# Patient Record
Sex: MALE | Race: ASIAN | NOT HISPANIC OR LATINO | URBAN - METROPOLITAN AREA
[De-identification: names, ages, dates, MRNs, and addresses within clinical notes are randomized per-mention and may not be internally consistent; named-entity substitution may affect disease eponyms.]

---

## 2024-02-02 ENCOUNTER — EMERGENCY (EMERGENCY)
Facility: HOSPITAL | Age: 39
LOS: 1 days | Discharge: HOME | End: 2024-02-02
Attending: STUDENT IN AN ORGANIZED HEALTH CARE EDUCATION/TRAINING PROGRAM | Admitting: STUDENT IN AN ORGANIZED HEALTH CARE EDUCATION/TRAINING PROGRAM
Payer: COMMERCIAL

## 2024-02-02 VITALS
OXYGEN SATURATION: 98 % | WEIGHT: 139.99 LBS | SYSTOLIC BLOOD PRESSURE: 112 MMHG | HEIGHT: 67 IN | HEART RATE: 100 BPM | TEMPERATURE: 98 F | RESPIRATION RATE: 22 BRPM | DIASTOLIC BLOOD PRESSURE: 71 MMHG

## 2024-02-02 PROCEDURE — 99284 EMERGENCY DEPT VISIT MOD MDM: CPT | Mod: 57

## 2024-02-02 PROCEDURE — 99053 MED SERV 10PM-8AM 24 HR FAC: CPT

## 2024-02-02 PROCEDURE — 27808 TREATMENT OF ANKLE FRACTURE: CPT | Mod: 54,RT

## 2024-02-02 NOTE — ED ADULT NURSE NOTE - NSFALLUNIVINTERV_ED_ALL_ED
Bed/Stretcher in lowest position, wheels locked, appropriate side rails in place/Call bell, personal items and telephone in reach/Instruct patient to call for assistance before getting out of bed/chair/stretcher/Non-slip footwear applied when patient is off stretcher/[place] to call system/Physically safe environment - no spills, clutter or unnecessary equipment/Purposeful proactive rounding/Room/bathroom lighting operational, light cord in reach

## 2024-02-02 NOTE — ED ADULT NURSE NOTE - SUICIDE SCREENING DEPRESSION
Negative Medical Necessity Clause: This procedure was medically necessary because the lesion that was treated was:

## 2024-02-03 VITALS
TEMPERATURE: 98 F | RESPIRATION RATE: 18 BRPM | DIASTOLIC BLOOD PRESSURE: 77 MMHG | SYSTOLIC BLOOD PRESSURE: 122 MMHG | OXYGEN SATURATION: 98 % | HEART RATE: 88 BPM

## 2024-02-03 PROCEDURE — 73620 X-RAY EXAM OF FOOT: CPT

## 2024-02-03 PROCEDURE — 99284 EMERGENCY DEPT VISIT MOD MDM: CPT

## 2024-02-03 PROCEDURE — 73610 X-RAY EXAM OF ANKLE: CPT

## 2024-02-03 PROCEDURE — 73620 X-RAY EXAM OF FOOT: CPT | Mod: 26,RT

## 2024-02-03 PROCEDURE — 29515 APPLICATION SHORT LEG SPLINT: CPT | Mod: RT

## 2024-02-03 PROCEDURE — 73610 X-RAY EXAM OF ANKLE: CPT | Mod: 26,RT

## 2024-02-03 NOTE — ED PROVIDER NOTE - MUSCULOSKELETAL, MLM
Spine appears normal, range of motion is not limited.  Right ankle with significant swelling and tenderness at lateral malleolus.  Mild ecchymosis.  No bone protrusion.  Decreased ROM.  +Right DP pulse, cap refill < 2 seconds

## 2024-02-03 NOTE — ED PROVIDER NOTE - NSFOLLOWUPINSTRUCTIONS_ED_ALL_ED_FT
Please follow up with orthopedic surgery.  Take Motrin or Tylenol for pain.  Do not bear weight on your foot, use crutches as instructed.  You will likely need surgery on your right ankle.  Return to the ER for persistent pain, swelling, numbness, in ability to walk, tingling, or any other concerns.     Nondisplaced Fibular Ankle Fracture Treated With Immobilization       A nondisplaced fibular ankle fracture is a simple break of the bottom of the fibula. The fibula is a bone in the lower leg, between the knee and the foot. In a nondisplaced fracture, the pieces of the broken bone line up with each other and are not out of place. This condition usually does not need surgery and can be treated with a splint or cast.    What are the causes?  This condition may be caused by:    A hard, direct hit or injury to the side of the leg.  A powerful twisting or rotating movement.  Rolling the ankle.  Falling or tripping.    What increases the risk?  You are more likely to develop this condition if:    You play sports that involve a lot of running and pivoting, such as basketball.  You play impact sports, such as football or soccer.  You smoke.  You have diabetes.  You have a history of ankle fractures.  You are obese.    What are the signs or symptoms?  Symptoms of this condition include:    Severe pain that begins immediately after the injury.  Bruising.  Swelling.  Inability to put weight on the injured ankle.  An ankle that is tender to the touch.    How is this diagnosed?  This condition is diagnosed based on:    Your medical history.  A physical exam.  Imaging tests to confirm the fracture and to check the extent of the injury. These tests may include:    X-rays.  Stress X-ray. During this test, your health care provider will put pressure on your ankle while taking an X-ray. This will help to determine whether your ankle is stable.  CT scan.  MRI.    How is this treated?  This condition may be treated with:    A splint.  Icing and raising (elevating) the ankle.  A cast.  A removable cast or walking boot.  Crutches. These may be needed to help you walk.    Follow these instructions at home:      If you have a splint, removable cast, or boot:    Wear the splint, cast, or boot as told by your health care provider. Remove it only as told by your health care provider.   Loosen it if your toes tingle, become numb, or turn cold and blue.  Keep it clean and dry.        If you have a non-removable cast:    Do not put pressure on any part of the cast until it is fully hardened. This may take several hours.  Check the skin around the cast every day. Tell your health care provider about any concerns.  Do not stick anything inside the cast to scratch your skin. Doing that increases your risk of infection.  You may put lotion on dry skin around the edges of the cast. Do not put lotion on the skin underneath it.   Do not break off edges or trim your cast.  Keep it clean and dry.        Bathing    Do not take baths, swim, or use a hot tub until your health care provider approves. Ask your health care provider if you may take showers. You may only be allowed to take sponge baths.  If the splint, cast, or walking boot is not waterproof:    Do not let it get wet.  Cover it with a watertight covering when you take a bath or shower.        Managing pain, stiffness, and swelling     If directed, put ice on the injured area. To do this:    If you have a removable splint, cast, or boot, remove it as told by your health care provider.  Put ice in a plastic bag.  Place a towel between your skin and the bag, or between your cast and the bag.  Leave the ice on for 20 minutes, 2–3 times a day.  Move your toes often to reduce stiffness and swelling.  Raise (elevate) the injured area above the level of your heart while you are sitting or lying down.        Activity    Do not use the injured leg to support your body weight until your health care provider says that you can. Use crutches as told by your health care provider.  Resume walking without crutches as directed by your health care provider.  Do exercises and stretches as told by your health care provider.        Driving    Ask your health care provider when it is safe to drive if you have a splint, cast, or boot on your ankle.  Ask your health care provider if the medicine prescribed to you requires you to avoid driving or using machinery.         General instructions     Take over-the-counter and prescription medicines only as told by your health care provider.  Do not use any products that contain nicotine or tobacco, such as cigarettes, e-cigarettes, and chewing tobacco. These can delay bone healing. If you need help quitting, ask your health care provider.  Keep all follow-up visits as told by your health care provider. This is important.    Contact a health care provider if:  Your cast gets damaged or it breaks.  Your pain does not get better with medicine.    Get help right away if:  You develop severe pain or more swelling in your ankle or foot that cannot be controlled with medicines.  Your skin or nails below the injury turn blue or gray, feel cold, or become numb.  The skin under your cast burns or stings.  There is a bad smell or pus coming from under the cast.  You cannot move your toes.    Summary  A nondisplaced fibular ankle fracture is a simple break of the bottom of a bone in the lower leg (fibula).  This condition may be treated with a splint, icing and elevation, a cast, or a removable cast or walking boot. You may also need crutches to help you walk while your ankle heals.  To help manage pain, stiffness, and swelling, put ice on the injured area as directed by your health care provider.  You should not use the injured leg to support your body weight until your health care provider says that you can. Use crutches as told by your health care provider.    ADDITIONAL NOTES AND INSTRUCTIONS    Please follow up with your Primary MD in 24-48 hr.  Seek immediate medical care for any new/worsening signs or symptoms.

## 2024-02-03 NOTE — ED PROVIDER NOTE - CARE PROVIDER_API CALL
Faustino Doll  Orthopaedic Surgery  [address], [facility] [address]  [place] [address]  Phone: [phone]  Fax: [phone]  Follow Up Time:

## 2024-02-03 NOTE — ED PROVIDER NOTE - DIFFERENTIAL DIAGNOSIS
Ddx includes but not limited to fracture, dislocation, contusion, ligament injury, tendon rupture, hematoma, avulsion Differential Diagnosis

## 2024-02-03 NOTE — ED PROVIDER NOTE - OBJECTIVE STATEMENT
38 year old male with no significant PMH presents with right ankle pain.  Patient was ice skating and his blade caught in the ice.  He twisted his right ankle and fell to the ground.  No LOC or head trauma.  He has not been able to weight-bear since the fall. He took 600mg Motrin PTA. [place] and he drove himself to the ED.  He lives in [place] and states [facility] [place].

## 2024-02-03 NOTE — ED PROVIDER NOTE - PROGRESS NOTE DETAILS
Patient declining Percocet.  [facility] would [facility] to [facility].  Given crutches.  X-ray given on CD

## 2024-02-03 NOTE — ED PROVIDER NOTE - RESPIRATORY NEGATIVE STATEMENT, MLM
[facility]      OUTPATIENT PHYSICAL THERAPY EVALUATION  PLAN OF TREATMENT FOR OUTPATIENT REHABILITATION  (COMPLETE FOR INITIAL CLAIMS ONLY)  Patient's Last Name, First Name, M.I.  YOB: 1956  Kat Huertas                           Provider's Name  [facility] Medical Record No.  [id number]                               Onset Date:  03/23/03/31/22    Referring Physician: Amira Cobb            Initial Assessment        See Physical Therapy evaluation dated (P) 03/24/22 in [employer] electronic health record.   no chest pain, no cough, and no shortness of breath.

## 2024-02-03 NOTE — ED PROVIDER NOTE - PATIENT PORTAL LINK FT
You can access the FollowMyHealth Patient Portal offered by [facility] by registering at the following website: [email or web address]. By joining [employer]’s FollowMyHealth portal, you will also be able to view your health information using other applications (apps) compatible with our system.

## 2024-02-03 NOTE — ED PROVIDER NOTE - CLINICAL SUMMARY MEDICAL DECISION MAKING FREE TEXT BOX
38 year old male p/w right ankle pain s/p twisting injury while ice skating.  Significant swelling to right lateal malleolus and decreased ROM.  X-ray, analgesia, splint, crutches, ortho follow up